# Patient Record
Sex: FEMALE | HISPANIC OR LATINO | URBAN - METROPOLITAN AREA
[De-identification: names, ages, dates, MRNs, and addresses within clinical notes are randomized per-mention and may not be internally consistent; named-entity substitution may affect disease eponyms.]

---

## 2022-11-04 ENCOUNTER — OFFICE (OUTPATIENT)
Dept: URBAN - METROPOLITAN AREA CLINIC 84 | Facility: CLINIC | Age: 77
Setting detail: OPHTHALMOLOGY
End: 2022-11-04
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
## 2022-11-04 PROCEDURE — NO SHOW FE NO SHOW FEE: Performed by: OPHTHALMOLOGY

## 2022-11-28 ENCOUNTER — RX ONLY (RX ONLY)
Age: 77
End: 2022-11-28

## 2022-11-28 ENCOUNTER — OFFICE (OUTPATIENT)
Dept: URBAN - METROPOLITAN AREA CLINIC 32 | Facility: CLINIC | Age: 77
Setting detail: OPHTHALMOLOGY
End: 2022-11-28
Payer: COMMERCIAL

## 2022-11-28 DIAGNOSIS — H43.813: ICD-10-CM

## 2022-11-28 DIAGNOSIS — H59.031: ICD-10-CM

## 2022-11-28 PROCEDURE — 92012 INTRM OPH EXAM EST PATIENT: CPT | Performed by: OPHTHALMOLOGY

## 2022-11-28 PROCEDURE — 92201 OPSCPY EXTND RTA DRAW UNI/BI: CPT | Performed by: OPHTHALMOLOGY

## 2022-11-28 PROCEDURE — 92134 CPTRZ OPH DX IMG PST SGM RTA: CPT | Performed by: OPHTHALMOLOGY

## 2022-11-28 ASSESSMENT — TONOMETRY
OD_IOP_MMHG: 18
OS_IOP_MMHG: 20

## 2022-11-28 ASSESSMENT — SUPERFICIAL PUNCTATE KERATITIS (SPK)
OS_SPK: 2+
OD_SPK: 2+

## 2022-11-28 ASSESSMENT — CONFRONTATIONAL VISUAL FIELD TEST (CVF)
OD_FINDINGS: FULL
OS_FINDINGS: FULL

## 2022-11-28 ASSESSMENT — CORNEAL PTERYGIUM: OD_PTERYGIUM: TEMPORAL 1MM

## 2022-11-28 ASSESSMENT — CORNEAL EDEMA - MICROCYSTIC EPITHELIAL EDEMA (MCE): OD_MCE: 2+

## 2022-12-05 ASSESSMENT — KERATOMETRY
OD_K2POWER_DIOPTERS: 43.75
OS_K1POWER_DIOPTERS: 43.75
OS_K2POWER_DIOPTERS: 41.50
OD_AXISANGLE_DEGREES: 072
OD_K1POWER_DIOPTERS: 41.75
OS_AXISANGLE_DEGREES: 068

## 2022-12-05 ASSESSMENT — REFRACTION_AUTOREFRACTION
OS_CYLINDER: -3.75
OD_SPHERE: UNABLE
OS_AXIS: 061
OS_SPHERE: +3.25

## 2022-12-05 ASSESSMENT — SPHEQUIV_DERIVED: OS_SPHEQUIV: 1.375

## 2022-12-05 ASSESSMENT — VISUAL ACUITY
OS_BCVA: 20/60
OD_BCVA: 20/80-2

## 2022-12-05 ASSESSMENT — AXIALLENGTH_DERIVED: OS_AL: 23.3795

## 2022-12-27 ENCOUNTER — OFFICE (OUTPATIENT)
Dept: URBAN - METROPOLITAN AREA CLINIC 84 | Facility: CLINIC | Age: 77
Setting detail: OPHTHALMOLOGY
End: 2022-12-27
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
## 2022-12-27 PROCEDURE — NO SHOW FE NO SHOW FEE: Performed by: OPHTHALMOLOGY

## 2023-01-10 ENCOUNTER — OFFICE (OUTPATIENT)
Dept: URBAN - METROPOLITAN AREA CLINIC 93 | Facility: CLINIC | Age: 78
Setting detail: OPHTHALMOLOGY
End: 2023-01-10
Payer: MEDICARE
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
## 2023-01-10 DIAGNOSIS — H18.231: ICD-10-CM

## 2023-01-10 DIAGNOSIS — H40.1122: ICD-10-CM

## 2023-01-10 DIAGNOSIS — H40.1113: ICD-10-CM

## 2023-01-10 PROCEDURE — 99213 OFFICE O/P EST LOW 20 MIN: CPT | Performed by: OPHTHALMOLOGY

## 2023-01-10 PROCEDURE — 55555 MISCELLANEOUS CHARGES: CPT | Performed by: OPHTHALMOLOGY

## 2023-01-10 ASSESSMENT — SPHEQUIV_DERIVED
OS_SPHEQUIV: 3.375
OD_SPHEQUIV: 0.75

## 2023-01-10 ASSESSMENT — KERATOMETRY
OS_K2POWER_DIOPTERS: 42.00
OS_K1POWER_DIOPTERS: 43.75
OD_AXISANGLE_DEGREES: 103
OS_AXISANGLE_DEGREES: 71
OD_K1POWER_DIOPTERS: 38.25
OD_K2POWER_DIOPTERS: 41.75

## 2023-01-10 ASSESSMENT — PACHYMETRY
OD_CT_UM: 479
OD_CT_CORRECTION: 5
OS_CT_CORRECTION: 5
OS_CT_UM: 474

## 2023-01-10 ASSESSMENT — CORNEAL PTERYGIUM: OD_PTERYGIUM: TEMPORAL 1MM

## 2023-01-10 ASSESSMENT — REFRACTION_AUTOREFRACTION
OD_CYLINDER: -1.00
OD_SPHERE: +1.25
OS_CYLINDER: -2.75
OS_AXIS: 70
OS_SPHERE: +4.75
OD_AXIS: 139

## 2023-01-10 ASSESSMENT — VISUAL ACUITY
OS_BCVA: 20/70
OD_BCVA: 20/80

## 2023-01-10 ASSESSMENT — AXIALLENGTH_DERIVED
OS_AL: 22.5543
OD_AL: 24.6289

## 2023-01-10 ASSESSMENT — SUPERFICIAL PUNCTATE KERATITIS (SPK)
OS_SPK: 2+
OD_SPK: 2+

## 2023-01-10 ASSESSMENT — TONOMETRY
OS_IOP_MMHG: 16
OD_IOP_MMHG: 16

## 2023-01-10 ASSESSMENT — CONFRONTATIONAL VISUAL FIELD TEST (CVF)
OD_FINDINGS: FULL
OS_FINDINGS: FULL

## 2023-01-10 ASSESSMENT — CORNEAL EDEMA - MICROCYSTIC EPITHELIAL EDEMA (MCE): OD_MCE: 2+

## 2023-01-17 ENCOUNTER — OFFICE (OUTPATIENT)
Dept: URBAN - METROPOLITAN AREA CLINIC 93 | Facility: CLINIC | Age: 78
Setting detail: OPHTHALMOLOGY
End: 2023-01-17
Payer: MEDICARE

## 2023-01-17 ENCOUNTER — RX ONLY (RX ONLY)
Age: 78
End: 2023-01-17

## 2023-01-17 VITALS — HEIGHT: 60 IN | WEIGHT: 180 LBS | BODY MASS INDEX: 35.34 KG/M2

## 2023-01-17 DIAGNOSIS — H18.231: ICD-10-CM

## 2023-01-17 PROCEDURE — 99213 OFFICE O/P EST LOW 20 MIN: CPT | Performed by: OPHTHALMOLOGY

## 2023-01-17 ASSESSMENT — TONOMETRY
OS_IOP_MMHG: 16
OD_IOP_MMHG: 21

## 2023-01-17 ASSESSMENT — SUPERFICIAL PUNCTATE KERATITIS (SPK)
OS_SPK: 2+
OD_SPK: 2+

## 2023-01-17 ASSESSMENT — PACHYMETRY
OS_CT_UM: 474
OS_CT_CORRECTION: 5
OD_CT_CORRECTION: 5
OD_CT_UM: 479

## 2023-01-17 ASSESSMENT — CORNEAL EDEMA - MICROCYSTIC EPITHELIAL EDEMA (MCE): OD_MCE: 2+

## 2023-01-17 ASSESSMENT — CORNEAL PTERYGIUM: OD_PTERYGIUM: TEMPORAL 1MM

## 2023-01-24 ASSESSMENT — AXIALLENGTH_DERIVED: OS_AL: 22.7739

## 2023-01-24 ASSESSMENT — KERATOMETRY
OS_K1POWER_DIOPTERS: 43.75
OS_AXISANGLE_DEGREES: 71
OS_K2POWER_DIOPTERS: 41.50

## 2023-01-24 ASSESSMENT — REFRACTION_AUTOREFRACTION
OS_CYLINDER: -3.50
OD_CYLINDER: -1.75
OS_SPHERE: +4.75
OD_AXIS: 94
OS_AXIS: 63
OD_SPHERE: +5.75

## 2023-01-24 ASSESSMENT — REFRACTION_CURRENTRX
OS_SPHERE: +2.25
OS_VPRISM_DIRECTION: SV
OD_CYLINDER: SPHERE
OD_OVR_VA: 20/
OD_SPHERE: +2.25
OD_VPRISM_DIRECTION: SV
OS_OVR_VA: 20/
OS_CYLINDER: SPHERE

## 2023-01-24 ASSESSMENT — VISUAL ACUITY
OD_BCVA: 20/80
OS_BCVA: 20/100

## 2023-01-24 ASSESSMENT — SPHEQUIV_DERIVED
OS_SPHEQUIV: 3
OD_SPHEQUIV: 4.875

## 2023-03-20 ENCOUNTER — OFFICE (OUTPATIENT)
Facility: LOCATION | Age: 78
Setting detail: OPHTHALMOLOGY
End: 2023-03-20
Payer: MEDICARE
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
## 2023-03-20 PROCEDURE — NO SHOW FE NO SHOW FEE: Performed by: OPHTHALMOLOGY

## 2023-04-11 ENCOUNTER — OFFICE (OUTPATIENT)
Facility: LOCATION | Age: 78
Setting detail: OPHTHALMOLOGY
End: 2023-04-11
Payer: MEDICARE

## 2023-04-11 DIAGNOSIS — H11.041: ICD-10-CM

## 2023-04-11 DIAGNOSIS — H16.223: ICD-10-CM

## 2023-04-11 DIAGNOSIS — H40.1122: ICD-10-CM

## 2023-04-11 DIAGNOSIS — H40.1113: ICD-10-CM

## 2023-04-11 DIAGNOSIS — H26.493: ICD-10-CM

## 2023-04-11 DIAGNOSIS — H18.231: ICD-10-CM

## 2023-04-11 PROCEDURE — 99213 OFFICE O/P EST LOW 20 MIN: CPT | Performed by: OPHTHALMOLOGY

## 2023-04-11 ASSESSMENT — SUPERFICIAL PUNCTATE KERATITIS (SPK)
OS_SPK: 2+
OD_SPK: 2+

## 2023-04-11 ASSESSMENT — KERATOMETRY
OS_AXISANGLE_DEGREES: 065
OD_K1POWER_DIOPTERS: 44.25
OD_K2POWER_DIOPTERS: 42.75
OS_K2POWER_DIOPTERS: 42.00
OD_AXISANGLE_DEGREES: 089
OS_K1POWER_DIOPTERS: 43.75

## 2023-04-11 ASSESSMENT — CORNEAL EDEMA - MICROCYSTIC EPITHELIAL EDEMA (MCE): OD_MCE: 2+

## 2023-04-11 ASSESSMENT — VISUAL ACUITY
OS_BCVA: 20/150-2
OD_BCVA: 20/80-2

## 2023-04-11 ASSESSMENT — REFRACTION_CURRENTRX
OD_OVR_VA: 20/
OD_CYLINDER: SPHERE
OS_VPRISM_DIRECTION: SV
OS_CYLINDER: SPHERE
OD_SPHERE: +2.25
OS_SPHERE: +2.25
OD_VPRISM_DIRECTION: SV
OS_OVR_VA: 20/

## 2023-04-11 ASSESSMENT — CONFRONTATIONAL VISUAL FIELD TEST (CVF)
OD_FINDINGS: FULL
OS_FINDINGS: FULL

## 2023-04-11 ASSESSMENT — PACHYMETRY
OD_CT_CORRECTION: 5
OS_CT_UM: 474
OD_CT_UM: 479
OS_CT_CORRECTION: 5

## 2023-04-11 ASSESSMENT — SPHEQUIV_DERIVED: OS_SPHEQUIV: 5.125

## 2023-04-11 ASSESSMENT — TONOMETRY
OD_IOP_MMHG: 17
OS_IOP_MMHG: 16

## 2023-04-11 ASSESSMENT — REFRACTION_AUTOREFRACTION
OS_CYLINDER: -3.75
OS_AXIS: 078
OD_SPHERE: UNABLE
OS_SPHERE: +7.00

## 2023-04-11 ASSESSMENT — AXIALLENGTH_DERIVED: OS_AL: 21.9479

## 2023-04-11 ASSESSMENT — CORNEAL PTERYGIUM: OD_PTERYGIUM: TEMPORAL 1MM

## 2023-04-17 ENCOUNTER — OFFICE (OUTPATIENT)
Dept: URBAN - METROPOLITAN AREA CLINIC 32 | Facility: CLINIC | Age: 78
Setting detail: OPHTHALMOLOGY
End: 2023-04-17
Payer: MEDICARE

## 2023-04-17 DIAGNOSIS — H43.813: ICD-10-CM

## 2023-04-17 DIAGNOSIS — H59.031: ICD-10-CM

## 2023-04-17 PROCEDURE — 92012 INTRM OPH EXAM EST PATIENT: CPT | Performed by: OPHTHALMOLOGY

## 2023-04-17 PROCEDURE — 92134 CPTRZ OPH DX IMG PST SGM RTA: CPT | Performed by: OPHTHALMOLOGY

## 2023-04-17 ASSESSMENT — REFRACTION_CURRENTRX
OD_OVR_VA: 20/
OS_VPRISM_DIRECTION: SV
OD_VPRISM_DIRECTION: SV
OS_OVR_VA: 20/
OD_SPHERE: +2.25
OS_SPHERE: +2.25
OD_CYLINDER: SPHERE
OS_CYLINDER: SPHERE

## 2023-04-17 ASSESSMENT — REFRACTION_AUTOREFRACTION
OS_SPHERE: +7.00
OS_AXIS: 078
OS_CYLINDER: -3.75
OD_SPHERE: UNABLE

## 2023-04-17 ASSESSMENT — KERATOMETRY
OD_K2POWER_DIOPTERS: 42.75
OD_AXISANGLE_DEGREES: 089
OS_K2POWER_DIOPTERS: 42.00
OD_K1POWER_DIOPTERS: 44.25
OS_AXISANGLE_DEGREES: 065
OS_K1POWER_DIOPTERS: 43.75

## 2023-04-17 ASSESSMENT — VISUAL ACUITY
OD_BCVA: 20/200
OS_BCVA: 20/HM

## 2023-04-17 ASSESSMENT — CORNEAL PTERYGIUM: OD_PTERYGIUM: TEMPORAL 1MM

## 2023-04-17 ASSESSMENT — CORNEAL EDEMA - MICROCYSTIC EPITHELIAL EDEMA (MCE): OD_MCE: 2+

## 2023-04-17 ASSESSMENT — SUPERFICIAL PUNCTATE KERATITIS (SPK)
OD_SPK: 2+
OS_SPK: 2+

## 2023-04-17 ASSESSMENT — AXIALLENGTH_DERIVED: OS_AL: 21.9479

## 2023-04-17 ASSESSMENT — SPHEQUIV_DERIVED: OS_SPHEQUIV: 5.125

## 2023-04-18 ENCOUNTER — OFFICE (OUTPATIENT)
Facility: LOCATION | Age: 78
Setting detail: OPHTHALMOLOGY
End: 2023-04-18
Payer: MEDICARE

## 2023-04-18 DIAGNOSIS — H18.20: ICD-10-CM

## 2023-04-18 PROCEDURE — 99213 OFFICE O/P EST LOW 20 MIN: CPT | Performed by: OPHTHALMOLOGY

## 2023-04-18 ASSESSMENT — REFRACTION_CURRENTRX
OS_SPHERE: +2.25
OS_OVR_VA: 20/
OD_CYLINDER: SPHERE
OD_SPHERE: +2.25
OD_OVR_VA: 20/
OS_CYLINDER: SPHERE
OD_VPRISM_DIRECTION: SV
OS_VPRISM_DIRECTION: SV

## 2023-04-18 ASSESSMENT — CONFRONTATIONAL VISUAL FIELD TEST (CVF)
OS_FINDINGS: FULL
OD_FINDINGS: FULL

## 2023-04-18 ASSESSMENT — KERATOMETRY
OS_K1POWER_DIOPTERS: 44.00
OD_AXISANGLE_DEGREES: 127
OS_K2POWER_DIOPTERS: 42.00
OD_K2POWER_DIOPTERS: 43.00
OS_AXISANGLE_DEGREES: 076
OD_K1POWER_DIOPTERS: 40.00

## 2023-04-18 ASSESSMENT — SUPERFICIAL PUNCTATE KERATITIS (SPK)
OD_SPK: 2+
OS_SPK: 2+

## 2023-04-18 ASSESSMENT — PACHYMETRY
OD_CT_CORRECTION: 5
OS_CT_UM: 474
OD_CT_UM: 479
OS_CT_CORRECTION: 5

## 2023-04-18 ASSESSMENT — REFRACTION_AUTOREFRACTION
OS_CYLINDER: -5.00
OS_SPHERE: +6.50
OS_AXIS: 067
OD_SPHERE: UNABLE

## 2023-04-18 ASSESSMENT — CORNEAL PTERYGIUM: OD_PTERYGIUM: TEMPORAL 1MM

## 2023-04-18 ASSESSMENT — TONOMETRY: OS_IOP_MMHG: 18

## 2023-04-18 ASSESSMENT — AXIALLENGTH_DERIVED: OS_AL: 22.2928

## 2023-04-18 ASSESSMENT — CORNEAL EDEMA - MICROCYSTIC EPITHELIAL EDEMA (MCE): OD_MCE: 2+

## 2023-04-18 ASSESSMENT — VISUAL ACUITY
OD_BCVA: 20/100+2
OS_BCVA: CF 2FT

## 2023-04-18 ASSESSMENT — SPHEQUIV_DERIVED: OS_SPHEQUIV: 4

## 2023-05-13 ENCOUNTER — OFFICE (OUTPATIENT)
Dept: URBAN - METROPOLITAN AREA CLINIC 27 | Facility: CLINIC | Age: 78
Setting detail: OPHTHALMOLOGY
End: 2023-05-13
Payer: MEDICARE

## 2023-05-13 ENCOUNTER — RX ONLY (RX ONLY)
Age: 78
End: 2023-05-13

## 2023-05-13 DIAGNOSIS — H18.20: ICD-10-CM

## 2023-05-13 PROCEDURE — 99213 OFFICE O/P EST LOW 20 MIN: CPT | Performed by: OPHTHALMOLOGY

## 2023-05-13 PROCEDURE — 92286 ANT SGM IMG I&R SPECLR MIC: CPT | Performed by: OPHTHALMOLOGY

## 2023-05-13 PROCEDURE — 92285 EXTERNAL OCULAR PHOTOGRAPHY: CPT | Performed by: OPHTHALMOLOGY

## 2023-05-13 ASSESSMENT — REFRACTION_CURRENTRX
OD_OVR_VA: 20/
OD_VPRISM_DIRECTION: SV
OS_OVR_VA: 20/
OD_CYLINDER: SPHERE
OS_SPHERE: +2.25
OD_SPHERE: +2.25
OS_VPRISM_DIRECTION: SV
OS_CYLINDER: SPHERE

## 2023-05-13 ASSESSMENT — SUPERFICIAL PUNCTATE KERATITIS (SPK)
OD_SPK: 2+
OS_SPK: 2+

## 2023-05-13 ASSESSMENT — TONOMETRY
OD_IOP_MMHG: 12
OS_IOP_MMHG: 16
OD_IOP_MMHG: 16

## 2023-05-13 ASSESSMENT — VISUAL ACUITY
OD_BCVA: 20/70-2
OS_BCVA: 20/500

## 2023-05-13 ASSESSMENT — KERATOMETRY
OD_K1POWER_DIOPTERS: UNABLE
OS_K1POWER_DIOPTERS: 41.75
OS_K2POWER_DIOPTERS: 43.75
METHOD_AUTO_MANUAL: AUTO
OS_AXISANGLE_DEGREES: 164

## 2023-05-13 ASSESSMENT — PACHYMETRY
OS_CT_UM: 474
OS_CT_CORRECTION: 5
OD_CT_CORRECTION: 5
OD_CT_UM: 479

## 2023-05-13 ASSESSMENT — REFRACTION_AUTOREFRACTION
OD_SPHERE: UNABLE
OS_SPHERE: UNABLE

## 2023-05-13 ASSESSMENT — CORNEAL PTERYGIUM: OD_PTERYGIUM: TEMPORAL 1MM

## 2023-08-03 ENCOUNTER — OFFICE (OUTPATIENT)
Dept: URBAN - METROPOLITAN AREA CLINIC 32 | Facility: CLINIC | Age: 78
Setting detail: OPHTHALMOLOGY
End: 2023-08-03
Payer: MEDICARE

## 2023-08-03 DIAGNOSIS — H59.031: ICD-10-CM

## 2023-08-03 DIAGNOSIS — H43.813: ICD-10-CM

## 2023-08-03 PROCEDURE — 92134 CPTRZ OPH DX IMG PST SGM RTA: CPT | Performed by: OPHTHALMOLOGY

## 2023-08-03 PROCEDURE — 99213 OFFICE O/P EST LOW 20 MIN: CPT | Performed by: OPHTHALMOLOGY

## 2023-08-03 ASSESSMENT — KERATOMETRY
OS_AXISANGLE_DEGREES: 164
OS_K2POWER_DIOPTERS: 43.75
OD_K1POWER_DIOPTERS: UNABLE
OS_K1POWER_DIOPTERS: 41.75
METHOD_AUTO_MANUAL: AUTO

## 2023-08-03 ASSESSMENT — CONFRONTATIONAL VISUAL FIELD TEST (CVF)
OD_FINDINGS: FULL
OS_FINDINGS: FULL

## 2023-08-03 ASSESSMENT — SUPERFICIAL PUNCTATE KERATITIS (SPK)
OD_SPK: 2+
OS_SPK: 2+

## 2023-08-03 ASSESSMENT — REFRACTION_AUTOREFRACTION
OS_SPHERE: UNABLE
OD_SPHERE: UNABLE

## 2023-08-03 ASSESSMENT — VISUAL ACUITY
OS_BCVA: 20/150
OD_BCVA: 20/70-2

## 2023-08-03 ASSESSMENT — CORNEAL PTERYGIUM: OD_PTERYGIUM: TEMPORAL 1MM

## 2023-08-16 ENCOUNTER — OUTPATIENT (OUTPATIENT)
Dept: OUTPATIENT SERVICES | Facility: HOSPITAL | Age: 78
LOS: 1 days | End: 2023-08-16
Payer: COMMERCIAL

## 2023-08-16 VITALS
DIASTOLIC BLOOD PRESSURE: 62 MMHG | TEMPERATURE: 98 F | RESPIRATION RATE: 16 BRPM | HEART RATE: 58 BPM | SYSTOLIC BLOOD PRESSURE: 147 MMHG | OXYGEN SATURATION: 100 %

## 2023-08-16 VITALS
HEART RATE: 62 BPM | HEIGHT: 55 IN | DIASTOLIC BLOOD PRESSURE: 81 MMHG | WEIGHT: 178.13 LBS | SYSTOLIC BLOOD PRESSURE: 167 MMHG | RESPIRATION RATE: 21 BRPM | TEMPERATURE: 98 F

## 2023-08-16 DIAGNOSIS — Z98.49 CATARACT EXTRACTION STATUS, UNSPECIFIED EYE: Chronic | ICD-10-CM

## 2023-08-16 DIAGNOSIS — H18.11 BULLOUS KERATOPATHY, RIGHT EYE: ICD-10-CM

## 2023-08-16 DIAGNOSIS — Z98.51 TUBAL LIGATION STATUS: Chronic | ICD-10-CM

## 2023-08-16 PROCEDURE — 88312 SPECIAL STAINS GROUP 1: CPT | Mod: 26

## 2023-08-16 PROCEDURE — 65756 CORNEAL TRNSPL ENDOTHELIAL: CPT | Mod: AS,RT

## 2023-08-16 PROCEDURE — V2785: CPT

## 2023-08-16 PROCEDURE — 88304 TISSUE EXAM BY PATHOLOGIST: CPT

## 2023-08-16 PROCEDURE — 88312 SPECIAL STAINS GROUP 1: CPT

## 2023-08-16 PROCEDURE — 87070 CULTURE OTHR SPECIMN AEROBIC: CPT

## 2023-08-16 PROCEDURE — C1889: CPT

## 2023-08-16 PROCEDURE — 65756 CORNEAL TRNSPL ENDOTHELIAL: CPT | Mod: RT

## 2023-08-16 PROCEDURE — 88304 TISSUE EXAM BY PATHOLOGIST: CPT | Mod: 26

## 2023-08-16 PROCEDURE — 87077 CULTURE AEROBIC IDENTIFY: CPT

## 2023-08-16 DEVICE — GS SF6 SULFER HEXAFLUORIDE 3 L 20GM: Type: IMPLANTABLE DEVICE | Site: RIGHT | Status: FUNCTIONAL

## 2023-08-16 RX ORDER — ALENDRONATE SODIUM 70 MG/1
1 TABLET ORAL
Refills: 0 | DISCHARGE

## 2023-08-16 RX ORDER — CHOLECALCIFEROL (VITAMIN D3) 125 MCG
1 CAPSULE ORAL
Refills: 0 | DISCHARGE

## 2023-08-16 RX ORDER — ASPIRIN/CALCIUM CARB/MAGNESIUM 324 MG
1 TABLET ORAL
Refills: 0 | DISCHARGE

## 2023-08-16 RX ORDER — LOSARTAN POTASSIUM 100 MG/1
1 TABLET, FILM COATED ORAL
Refills: 0 | DISCHARGE

## 2023-08-16 RX ORDER — ACETAMINOPHEN 500 MG
1000 TABLET ORAL ONCE
Refills: 0 | Status: COMPLETED | OUTPATIENT
Start: 2023-08-16 | End: 2023-08-16

## 2023-08-16 RX ORDER — ATENOLOL 25 MG/1
1 TABLET ORAL
Refills: 0 | DISCHARGE

## 2023-08-16 RX ORDER — FUROSEMIDE 40 MG
1 TABLET ORAL
Refills: 0 | DISCHARGE

## 2023-08-16 RX ORDER — ALBUTEROL 90 UG/1
2 AEROSOL, METERED ORAL
Refills: 0 | DISCHARGE

## 2023-08-16 RX ORDER — OMEPRAZOLE 10 MG/1
1 CAPSULE, DELAYED RELEASE ORAL
Refills: 0 | DISCHARGE

## 2023-08-16 RX ADMIN — Medication 400 MILLIGRAM(S): at 10:06

## 2023-08-16 RX ADMIN — Medication 1000 MILLIGRAM(S): at 10:20

## 2023-08-16 NOTE — ASU DISCHARGE PLAN (ADULT/PEDIATRIC) - ASU DC SPECIAL INSTRUCTIONSFT
Take Prednisolone acetate and Ofloxacin 4 times a day right eye (lift patch to do so) - also take brimonidine 3 times/day both eyes and blue topped drop 2 times/day both eyes.    Please call [phone] if you have any questions or if you have pain or vomiting despite taking Tylenol (after 4:30 PM-9am).      YOU MUST LAY FLAT ON YOUR BACK X 24 HOURS UNLESS EATING OR GOING TO BATHROOM.  ONLY USE THE PILLOW GIVEN TO YOU BY [facility].

## 2023-08-16 NOTE — ASU DISCHARGE PLAN (ADULT/PEDIATRIC) - PROVIDER TOKENS
PROVIDER:[TOKEN:[[id number]]] PROVIDER:[TOKEN:[[id number]],SCHEDULEDAPPT:[08/17/2023],SCHEDULEDAPPTTIME:[11:45 AM]]

## 2023-08-16 NOTE — ASU PATIENT PROFILE, ADULT - NSICDXPASTMEDICALHX_GEN_ALL_CORE_FT
PAST MEDICAL HISTORY:  Angina pectoris Abnormal EKG    HTN (hypertension)     SUDARSHAN on CPAP

## 2023-08-16 NOTE — ASU PATIENT PROFILE, ADULT - FALL HARM RISK - HARM RISK INTERVENTIONS

## 2023-08-31 LAB
-  AMOXICILLIN/CLAVULANIC ACID: SIGNIFICANT CHANGE UP
-  CLINDAMYCIN: SIGNIFICANT CHANGE UP
-  IMIPENEM: SIGNIFICANT CHANGE UP
-  METRONIDAZOLE: SIGNIFICANT CHANGE UP
METHOD TYPE: SIGNIFICANT CHANGE UP

## 2023-09-06 LAB
CULTURE RESULTS: SIGNIFICANT CHANGE UP
ORGANISM # SPEC MICROSCOPIC CNT: SIGNIFICANT CHANGE UP
ORGANISM # SPEC MICROSCOPIC CNT: SIGNIFICANT CHANGE UP
SPECIMEN SOURCE: SIGNIFICANT CHANGE UP

## 2025-02-12 PROBLEM — Z00.00 ENCOUNTER FOR PREVENTIVE HEALTH EXAMINATION: Status: ACTIVE | Noted: 2025-02-12

## 2025-03-11 ENCOUNTER — LABORATORY RESULT (OUTPATIENT)
Age: 80
End: 2025-03-11

## 2025-03-11 ENCOUNTER — APPOINTMENT (OUTPATIENT)
Age: 80
End: 2025-03-11
Payer: MEDICARE

## 2025-03-11 VITALS
DIASTOLIC BLOOD PRESSURE: 80 MMHG | BODY MASS INDEX: 34.55 KG/M2 | WEIGHT: 176 LBS | SYSTOLIC BLOOD PRESSURE: 110 MMHG | HEIGHT: 60 IN

## 2025-03-11 DIAGNOSIS — Z12.39 ENCOUNTER FOR OTHER SCREENING FOR MALIGNANT NEOPLASM OF BREAST: ICD-10-CM

## 2025-03-11 DIAGNOSIS — Z95.0 PRESENCE OF CARDIAC PACEMAKER: ICD-10-CM

## 2025-03-11 DIAGNOSIS — Z86.79 PERSONAL HISTORY OF OTHER DISEASES OF THE CIRCULATORY SYSTEM: ICD-10-CM

## 2025-03-11 DIAGNOSIS — Z87.19 PERSONAL HISTORY OF OTHER DISEASES OF THE DIGESTIVE SYSTEM: ICD-10-CM

## 2025-03-11 DIAGNOSIS — N32.81 OVERACTIVE BLADDER: ICD-10-CM

## 2025-03-11 DIAGNOSIS — Z01.419 ENCOUNTER FOR GYNECOLOGICAL EXAMINATION (GENERAL) (ROUTINE) W/OUT ABNORMAL FINDINGS: ICD-10-CM

## 2025-03-11 DIAGNOSIS — N76.0 ACUTE VAGINITIS: ICD-10-CM

## 2025-03-11 PROCEDURE — 99213 OFFICE O/P EST LOW 20 MIN: CPT | Mod: 25

## 2025-03-11 PROCEDURE — 81003 URINALYSIS AUTO W/O SCOPE: CPT | Mod: QW

## 2025-03-11 PROCEDURE — G0101: CPT

## 2025-03-11 RX ORDER — ISOSORBIDE MONONITRATE 20 MG/1
TABLET ORAL
Refills: 0 | Status: ACTIVE | COMMUNITY

## 2025-03-11 RX ORDER — MIRABEGRON 25 MG/1
25 TABLET, EXTENDED RELEASE ORAL
Qty: 28 | Refills: 12 | Status: ACTIVE | COMMUNITY
Start: 2025-03-11 | End: 1900-01-01

## 2025-03-11 RX ORDER — VALSARTAN 40 MG/1
TABLET, COATED ORAL
Refills: 0 | Status: ACTIVE | COMMUNITY

## 2025-03-11 RX ORDER — ASPIRIN 325 MG/1
TABLET, FILM COATED ORAL
Refills: 0 | Status: ACTIVE | COMMUNITY

## 2025-03-11 RX ORDER — CLOTRIMAZOLE AND BETAMETHASONE DIPROPIONATE 10; .5 MG/G; MG/G
1-0.05 CREAM TOPICAL TWICE DAILY
Qty: 1 | Refills: 1 | Status: ACTIVE | COMMUNITY
Start: 2025-03-11 | End: 1900-01-01

## 2025-03-13 LAB — BACTERIA UR CULT: NORMAL

## (undated) DEVICE — VENODYNE/SCD SLEEVE CALF MEDIUM

## (undated) DEVICE — DRAPE 1/2 SHEET 40X57"

## (undated) DEVICE — ILM FORCEP 23G

## (undated) DEVICE — NDL HYPO NONSAFE 30G X 0.5" (BEIGE)

## (undated) DEVICE — ILM RESOLUTION FORCEP 23G DISP

## (undated) DEVICE — Device

## (undated) DEVICE — PACK OPTH CATARACT

## (undated) DEVICE — PUN CORNL VAC DONR BRRN 8MM

## (undated) DEVICE — CAPSULE GUARD I/A

## (undated) DEVICE — GLV 6.5 PROTEXIS (WHITE)

## (undated) DEVICE — ACM SELF RETAINING 20G

## (undated) DEVICE — SPONGE OPHTHALMIC ALCON SPEAR

## (undated) DEVICE — WARMING BLANKET LOWER ADULT